# Patient Record
Sex: FEMALE | Race: WHITE | URBAN - METROPOLITAN AREA
[De-identification: names, ages, dates, MRNs, and addresses within clinical notes are randomized per-mention and may not be internally consistent; named-entity substitution may affect disease eponyms.]

---

## 2023-03-27 ENCOUNTER — Encounter (OUTPATIENT)
Facility: LOCATION | Age: 73
End: 2023-03-27
Payer: COMMERCIAL

## 2023-03-27 PROCEDURE — 66991 XCAPSL CTRC RMVL INSJ 1+: CPT | Performed by: OPHTHALMOLOGY

## 2023-03-28 ENCOUNTER — POST-OPERATIVE VISIT (OUTPATIENT)
Facility: LOCATION | Age: 73
End: 2023-03-28
Payer: COMMERCIAL

## 2023-03-28 DIAGNOSIS — Z48.810 ENCOUNTER FOR SURGICAL AFTERCARE FOLLOWING SURGERY ON A SENSE ORGAN: Primary | ICD-10-CM

## 2023-03-28 DIAGNOSIS — H40.063 PRIMARY ANGLE CLOSURE WITHOUT GLAUCOMA DAMAGE, BILATERAL: ICD-10-CM

## 2023-03-28 DIAGNOSIS — H40.053 OCULAR HYPERTENSION, BILATERAL: ICD-10-CM

## 2023-03-28 DIAGNOSIS — H25.12 AGE-RELATED NUCLEAR CATARACT, LEFT EYE: ICD-10-CM

## 2023-03-28 PROCEDURE — 99024 POSTOP FOLLOW-UP VISIT: CPT | Performed by: OPHTHALMOLOGY

## 2023-03-28 NOTE — IMPRESSION/PLAN
Impression: HPI 09/27/2022: Patient reports diagnosed with glaucoma 2.5 years ago in [place] by Optometrist for over 30 years and was started on Lumigan OU 2 years ago. Patient reports initially Rx Latanoprost but caused severe burning. Denies any other glaucoma gtt, or any eye procedures/lasers. POHx: PAC OS>OD, OHTN OU, Cataracts OU, MDADY, Hyperopia with astigmatism OU, s/p LPI OS 11/2022, (-) ocular/head trauma FOHx: (-) glaucoma PMHx: Arthritis, h/o skin cancer, headache, thyroid problems. H/o Latanoprost causing severe burning. Plan: Testing:
OCT/ONH 09/2022: OU normal NFL/GCC, C/D OS>OD. Baseline. HVF 24-2 11/2022: OU scattered changes. Baseline. Pachy: 493/488. Gonio 09/2022: S/N PTM, T SOLOMON, Inf Closed, no PAS - opens to SS on dynamic gonio OU. Gonio 11/08/2022: OD SOLOMON 360. OS S/I/N SOLOMON, Inf Closed. Gonio 11/30/2022: OD S/I PTM, T/N SOLOMON. OS N/I PTM, S SOLOMON, Closed. Gonio 2/2023: OU SOLOMON, no PAS OU - post LPI. Tmax: Not established. Target IOP: Not established. Plan:
Discontinue Lumigan QHD OD. Continue Lumigan QHS OS.

## 2023-03-28 NOTE — IMPRESSION/PLAN
Impression: Examination revealed moderate senile nuclear cataract. 2+ NS OS Plan: Informed patient of slow progression of cataracts. Informed patient blurry VA is due to MADDY, Cataracts, and old Rx glasses. Recommend patient to plan for cataract surgery to improve VA and resolve PACS. Discussed R/B/A/Is of MIGS at time of cataract surgery to improve glaucoma care. Discussed findings on repeat [place] on today's examination. Informed patient of high astigmatism. Discussed with patient of astigmatism correction at time of cataract surgery including Toric IOL and LRI. Informed patient of out-of-pocket cost for astigmatism correction. Risks, benefits and alternative of cataract extraction with intraocular lens reviewed with patient, including but not limited to: Decreased Vision, Infection, Bleeding, Loss of Vision, Loss of Eye, Retinal detachment, macular edema, the need for additional surgeries or laser treatments, double vision, and need for glasses and/or contact lenses. Plan: CEIOL/iStent OS Target distance.

## 2023-03-28 NOTE — IMPRESSION/PLAN
Impression: CC: 1 day s/p CEIOL/iStent OD (3/27/2023). Plan: Continue ATs QID OU. Glaucoma gtt:
Discontinue Lumigan QHD OD. Continue Lumigan QHS OS. Surgery medications:
Continue Ofloxacin QID Continue Prednisolone QID Continue Ketorolac QID
 
RTC 1 week - check VA, Ks, MR, IOP and re-evaluation of operated eye. Patient reminded of post-operative restrictions (do not rub eye, do not bend over, do not  more than 10-15 lbs, do not sleep on side of surgery, do not get water, dust or dirt in eye, wear plastic shield while sleeping). RTC PRN decrease VA, increase pain, redness, discharge, photophobia, flashes/floaters, or other vision problems.

## 2023-04-04 ENCOUNTER — POST-OPERATIVE VISIT (OUTPATIENT)
Facility: LOCATION | Age: 73
End: 2023-04-04
Payer: COMMERCIAL

## 2023-04-04 DIAGNOSIS — H25.12 AGE-RELATED NUCLEAR CATARACT, LEFT EYE: ICD-10-CM

## 2023-04-04 DIAGNOSIS — H40.053 OCULAR HYPERTENSION, BILATERAL: ICD-10-CM

## 2023-04-04 DIAGNOSIS — Z48.810 ENCOUNTER FOR SURGICAL AFTERCARE FOLLOWING SURGERY ON A SENSE ORGAN: Primary | ICD-10-CM

## 2023-04-04 DIAGNOSIS — H40.063 PRIMARY ANGLE CLOSURE WITHOUT GLAUCOMA DAMAGE, BILATERAL: ICD-10-CM

## 2023-04-04 PROCEDURE — 99024 POSTOP FOLLOW-UP VISIT: CPT | Performed by: OPHTHALMOLOGY

## 2023-04-04 PROCEDURE — 92015 DETERMINE REFRACTIVE STATE: CPT | Performed by: OPHTHALMOLOGY

## 2023-04-04 ASSESSMENT — VISUAL ACUITY: OD: 20/20

## 2023-04-04 ASSESSMENT — INTRAOCULAR PRESSURE
OD: 16
OS: 14

## 2023-04-04 ASSESSMENT — KERATOMETRY: OD: 46.99

## 2023-04-04 NOTE — IMPRESSION/PLAN
Impression: Examination revealed moderate senile nuclear cataract. 2+ NS OS Plan: Informed patient of slow progression of cataracts. Informed patient blurry VA is due to MADDY, Cataracts, and old Rx glasses. Recommend patient to plan for cataract surgery to improve VA and resolve PACS. Discussed R/B/A/Is of MIGS at time of cataract surgery to improve glaucoma care. Discussed findings on repeat [place] on today's examination. Informed patient of high astigmatism. Discussed with patient of astigmatism correction at time of cataract surgery including Toric IOL and LRI. Informed patient of out-of-pocket cost for astigmatism correction. Risks, benefits and alternative of cataract extraction with intraocular lens reviewed with patient, including but not limited to: Decreased Vision, Infection, Bleeding, Loss of Vision, Loss of Eye, Retinal detachment, macular edema, the need for additional surgeries or laser treatments, double vision, and need for glasses and/or contact lenses. Plan: CEIOL/iStent OS Target distance. Patient understands she will need reading and Rx glasses for astigmatism after cataract surgery.

## 2023-04-04 NOTE — IMPRESSION/PLAN
Impression: HPI 09/27/2022: Patient reports diagnosed with glaucoma 2.5 years ago in [place] by Optometrist for over 30 years and was started on Lumigan OU 2 years ago. Patient reports initially Rx Latanoprost but caused severe burning. Denies any other glaucoma gtt, or any eye procedures/lasers. POHx: PAC OS>OD, OHTN OU, Cataracts OU, MADDY, Hyperopia with astigmatism OU, s/p LPI OS 11/2022, (-) ocular/head trauma FOHx: (-) glaucoma PMHx: Arthritis, h/o skin cancer, headache, thyroid problems. H/o Latanoprost causing severe burning. Plan: Testing:
OCT/ONH 09/2022: OU normal NFL/GCC, C/D OS>OD. Baseline. HVF 24-2 11/2022: OU scattered changes. Baseline. Pachy: 493/488. Gonio 09/2022: S/N PTM, T SOLOMON, Inf Closed, no PAS - opens to SS on dynamic gonio OU. Gonio 11/08/2022: OD SOLOMON 360. OS S/I/N SOLOMON, Inf Closed. Gonio 11/30/2022: OD S/I PTM, T/N SOLOMON. OS N/I PTM, S SOLOMON, Closed. Gonio 2/2023: OU SOLOMON, no PAS OU - post LPI. Tmax: Not established. Target IOP: Not established. Plan:
Discontinue Lumigan QHD OD. Continue Lumigan QHS OS.

## 2023-04-04 NOTE — IMPRESSION/PLAN
Impression: 1 week s/p CEIOL/iStent OD (3/27/2023). s/p CEIOL surgery doing well. PCIOL centered and clear. (-) Siedel sign Patient is pseudophakic Plan: Continue ATs QID OU. Glaucoma gtt:
Discontinue Lumigan QHD OD. Continue Lumigan QHS OS. OD Sx gtts:
D/c Ofloxacin QID Decrease Prednisolone QID -> BID until 4/25/2023. Continue Ketorolac QID until 4/25/2023. RTC as scheduled for CEIOL/iStent OS (4/10/2023). Patient understands she will need reading and Rx glasses for astigmatism after cataract surgery. Post-op medications: Ofloxacin QID OS, Prednisolone QID OS, and Ketorolac QID OS. All restrictions discontinued. Patient able to resume all normal activities. RTC PRN decrease VA, increase pain, redness, discharge, photophobia, flashes/floaters, or other vision problems.

## 2023-04-10 ENCOUNTER — PROCEDURE (OUTPATIENT)
Facility: LOCATION | Age: 73
End: 2023-04-10
Payer: COMMERCIAL

## 2023-04-11 ENCOUNTER — POST-OPERATIVE VISIT (OUTPATIENT)
Facility: LOCATION | Age: 73
End: 2023-04-11
Payer: COMMERCIAL

## 2023-04-11 DIAGNOSIS — Z48.810 ENCOUNTER FOR SURGICAL AFTERCARE FOLLOWING SURGERY ON A SENSE ORGAN: Primary | ICD-10-CM

## 2023-04-11 PROCEDURE — 99024 POSTOP FOLLOW-UP VISIT: CPT | Performed by: OPHTHALMOLOGY

## 2023-04-11 ASSESSMENT — INTRAOCULAR PRESSURE
OD: 12
OS: 10

## 2023-04-11 NOTE — IMPRESSION/PLAN
Impression: 1 week s/p CEIOL/iStent/Anterior vitrectomy OD (04/10/2023); 2 week s/p CEIOL/iStent OD (3/27/2023). s/p CEIOL surgery doing well. PCIOL centered and clear. (-) Siedel sign Patient is pseudophakic BCL present OS. Plan: Continue ATs QID OU. Glaucoma gtt:
Hold off Lumigan OD. Continue Lumigan QHS OS. OS: Sx gtts:
Continue Ofloxacin QID OS. Continue Ketorolac QID OS Increase Prednisolone to Q6xday OS. OD Sx gtts:
D/c Ofloxacin QID Decrease Prednisolone QID -> BID until 4/25/2023. Continue Ketorolac QID until 4/25/2023. RTC as scheduled for CEIOL/iStent OS (4/10/2023). Patient understands she will need reading and Rx glasses for astigmatism after cataract surgery. RTC 1 week - BCL removal OS, check VA, Ks, MR, IOP and re-evaluation of operated eye. Plan to remove suture OS in 4-6 weeks. Patient reminded of post-operative restrictions (do not rub eye, do not bend over, do not  more than 10-15 lbs, do not sleep on side of surgery, do not get water, dust or dirt in eye, wear plastic shield while sleeping). RTC PRN decrease VA, increase pain, redness, discharge, photophobia, flashes/floaters, or other vision problems. Discontinue Ofloxacin Decrease Pred from QID to BID x 3 weeks then stop Continue Ketorolac QID x 3 weeks then stop

## 2023-04-18 ENCOUNTER — POST-OPERATIVE VISIT (OUTPATIENT)
Facility: LOCATION | Age: 73
End: 2023-04-18
Payer: COMMERCIAL

## 2023-04-18 DIAGNOSIS — Z48.810 ENCOUNTER FOR SURGICAL AFTERCARE FOLLOWING SURGERY ON A SENSE ORGAN: Primary | ICD-10-CM

## 2023-04-18 ASSESSMENT — INTRAOCULAR PRESSURE
OD: 12
OS: 17

## 2023-04-18 ASSESSMENT — VISUAL ACUITY: OS: 20/40

## 2023-04-18 NOTE — IMPRESSION/PLAN
Impression: 1 week s/p Complex CEIOL/iStent/CTR/Anterior Vitrectomy OS (04/10/2023); 3 week s/p CEIOL/iStent OD (3/27/2023). s/p CEIOL surgery doing well. PCIOL centered and clear. (-) Siedel sign Patient is pseudophakic ST suture in place BCL in place OS. BCL removed today. Plan: Continue ATs QID OU. Glaucoma gtt:
Hold off Lumigan OD. Continue Lumigan QHS OS. OS: Sx gtts:
Continue Ketorolac QID OS until 5/9/2023. Taper Prednisolone QID x 1 week, TID x 1 week, BID x 1 week, QD x 1 week, then d/c.

OD Sx gtts:
Continue Prednisolone BID until 4/25/2023. Continue Ketorolac QID until 4/25/2023. RTC in 3 week with DFE OU -  check VA, Ks, MR, IOP and re-evaluation of operated eye. Plan to remove suture OS in 4-6 weeks. RTC PRN decrease VA, increase pain, redness, discharge, photophobia, flashes/floaters, or other vision problems.

## 2023-05-10 ENCOUNTER — POST-OPERATIVE VISIT (OUTPATIENT)
Facility: LOCATION | Age: 73
End: 2023-05-10
Payer: COMMERCIAL

## 2023-05-10 DIAGNOSIS — H04.123 DRY EYE SYNDROME OF BILATERAL LACRIMAL GLANDS: ICD-10-CM

## 2023-05-10 DIAGNOSIS — H40.063 PRIMARY ANGLE CLOSURE WITHOUT GLAUCOMA DAMAGE, BILATERAL: ICD-10-CM

## 2023-05-10 DIAGNOSIS — Z48.810 ENCOUNTER FOR SURGICAL AFTERCARE FOLLOWING SURGERY ON A SENSE ORGAN: Primary | ICD-10-CM

## 2023-05-10 PROCEDURE — 92015 DETERMINE REFRACTIVE STATE: CPT | Performed by: OPHTHALMOLOGY

## 2023-05-10 PROCEDURE — 99024 POSTOP FOLLOW-UP VISIT: CPT | Performed by: OPHTHALMOLOGY

## 2023-05-10 ASSESSMENT — VISUAL ACUITY
OS: 20/20
OD: 20/20

## 2023-05-10 ASSESSMENT — INTRAOCULAR PRESSURE
OS: 15
OD: 12

## 2023-05-10 ASSESSMENT — KERATOMETRY
OS: 46.37
OD: 47.13

## 2023-05-10 NOTE — IMPRESSION/PLAN
Impression: HPI 09/27/2022: Patient reports diagnosed with glaucoma 2.5 years ago in [place] by Optometrist for over 30 years and was started on Lumigan OU 2 years ago. Patient reports initially Rx Latanoprost but caused severe burning. Denies any other glaucoma gtt, or any eye procedures/lasers. POHx: h/o PAC OS>OD, OHTN OU, s/p CEIOL/iStent OD 3/2023, s/p Complex/iStent/CTR/Ant Vitrectomy OS 4/2023, MADDY, Hyperopia with astigmatism OU, s/p LPI OS 11/2022, (-) ocular/head trauma FOHx: (-) glaucoma PMHx: Arthritis, h/o skin cancer, headache, thyroid problems. H/o Latanoprost causing severe burning. Plan: Testing:
OCT/ONH 09/2022: OU normal NFL/GCC, C/D OS>OD. Baseline. HVF 24-2 11/2022: OU scattered changes. Baseline. Pachy: 493/488. Gonio 09/2022: S/N PTM, T SOLOMON, Inf Closed, no PAS - opens to SS on dynamic gonio OU. Gonio 11/08/2022: OD SOLOMON 360. OS S/I/N SOLOMON, Inf Closed. Gonio 11/30/2022: OD S/I PTM, T/N SOLOMON. OS N/I PTM, S SOLOMON, Closed. Gonio 2/2023: OU SOLOMON, no PAS OU - post LPI. Tmax: Not established. Target IOP: Not established. Plan:
Monitor without glaucoma gtts. RTC in 2 months with OCT [address] 74 OU and gonio.

## 2023-05-10 NOTE — IMPRESSION/PLAN
Impression: 1 month s/p Complex CEIOL/iStent/CTR/Anterior Vitrectomy OS (04/10/2023); s/p CEIOL/iStent OD (3/27/2023). s/p CEIOL surgery doing well. PCIOL centered and clear. (-) Siedel sign Patient is pseudophakic T suture in place OS. Removed today with Jeweler and needle without any complications. AC Deep and quiet OU
2+ SPK OU Plan: Continue ATs QID OU. Glaucoma gtt:
Hold off Lumigan QHS OS. OS Sx gtts:
Continue Ketorolac QID OS until 5/19/2023. Taper Prednisolone QID x 1 week, TID x 1 week, BID x 1 week, QD x 1 week, then d/c.

RTC PRN decrease VA, increase pain, redness, discharge, photophobia, flashes/floaters, or other vision problems.

## 2023-07-12 ENCOUNTER — OFFICE VISIT (OUTPATIENT)
Facility: LOCATION | Age: 73
End: 2023-07-12
Payer: COMMERCIAL

## 2023-07-12 DIAGNOSIS — H40.063 PRIMARY ANGLE CLOSURE WITHOUT GLAUCOMA DAMAGE, BILATERAL: Primary | ICD-10-CM

## 2023-07-12 DIAGNOSIS — H04.123 DRY EYE SYNDROME OF BILATERAL LACRIMAL GLANDS: ICD-10-CM

## 2023-07-12 PROCEDURE — 99214 OFFICE O/P EST MOD 30 MIN: CPT | Performed by: OPHTHALMOLOGY

## 2023-07-12 PROCEDURE — 92020 GONIOSCOPY: CPT | Performed by: OPHTHALMOLOGY

## 2023-07-12 PROCEDURE — 92133 CPTRZD OPH DX IMG PST SGM ON: CPT | Performed by: OPHTHALMOLOGY

## 2023-07-12 ASSESSMENT — INTRAOCULAR PRESSURE
OS: 14
OD: 11

## 2023-07-12 NOTE — IMPRESSION/PLAN
Impression: Examination revealed dry eye syndrome secondary to tear deficiencies. BLL very small punctum. Unable to place RLL extended plug due to small punctum. s/p Extended duration BUL, LLL (2/21/2023) with good improvement. Plan: Today:
Significant dryness OS>OD despite regular use of ATs. Discussed R/B/A/Is of extended duration punctal plugs vs. Silicone punctal plugs as next step to further improve dryness. Extended Duration Punctal Plugs inserted BUL and Silicone punctal plugs inserted LLL. Unable to place RLL. Plan:
Continue ATs TID-QID OU. RTC in 4 weeks for surface check.

## 2023-07-12 NOTE — IMPRESSION/PLAN
Impression: 2 month follow up with OCT/ONH and Gonio. HPI 09/27/2022: Patient reports diagnosed with glaucoma 2.5 years ago in [place] by Optometrist for over 30 years and was started on Lumigan OU 2 years ago. Patient reports initially Rx Latanoprost but caused severe burning. Denies any other glaucoma gtt, or any eye procedures/lasers. POHx: OHTN OU, h/o PAC OS>OD, s/p CEIOL/iStent OD 3/2023, s/p Complex/iStent/CTR/Ant Vitrectomy OS 4/2023, MADDY, H/o Hyperopia with astigmatism OU, now myopic, s/p LPI OS 11/2022, (-) ocular/head trauma FOHx: (-) glaucoma PMHx: Arthritis, h/o skin cancer, headache, thyroid problems. H/o Latanoprost causing severe burning. Plan: Testing:
OCT/ONH 07/2023: OU normal NFL/GCC, C/D OS>OD. Stable OU. HVF 24-2 11/2022: OU scattered changes. Baseline. Pachy: 493/488. Gonio 09/2022: S/N PTM, T SOLOMON, Inf Closed, no PAS - opens to SS on dynamic gonio OU. Gonio 11/08/2022: OD SOLOMON 360. OS S/I/N SOLOMON, Inf Closed. Gonio 11/30/2022: OD S/I PTM, T/N SOLOMON. OS N/I PTM, S SOLOMON, Closed. Gonio 2/2023: OU SOLOMON, no PAS OU - post LPI. Gonio 07/2023: SS 2+ 360 OU. Tmax: Not established. Target IOP: Not established. Today:
IOP acceptable OU. OCT/ONH and Gonioscopy performed and reviewed. Informed patient drainage system opened up after cataract surgery. PACs resolved now. Plan:
Monitor without glaucoma gtts. RTC in 6 months with IOP check and DFE OU. If stable exam will follow up once a year.

## 2023-08-09 ENCOUNTER — OFFICE VISIT (OUTPATIENT)
Facility: LOCATION | Age: 73
End: 2023-08-09
Payer: COMMERCIAL

## 2023-08-09 DIAGNOSIS — H04.123 DRY EYE SYNDROME OF BILATERAL LACRIMAL GLANDS: Primary | ICD-10-CM

## 2023-08-09 DIAGNOSIS — H40.063 PRIMARY ANGLE CLOSURE WITHOUT GLAUCOMA DAMAGE, BILATERAL: ICD-10-CM

## 2023-08-09 PROCEDURE — 99213 OFFICE O/P EST LOW 20 MIN: CPT | Performed by: OPHTHALMOLOGY

## 2023-08-09 ASSESSMENT — INTRAOCULAR PRESSURE
OS: 13
OD: 10

## 2024-01-10 ENCOUNTER — OFFICE VISIT (OUTPATIENT)
Facility: LOCATION | Age: 74
End: 2024-01-10
Payer: COMMERCIAL

## 2024-01-10 DIAGNOSIS — H04.123 DRY EYE SYNDROME OF BILATERAL LACRIMAL GLANDS: ICD-10-CM

## 2024-01-10 DIAGNOSIS — H40.063 PRIMARY ANGLE CLOSURE WITHOUT GLAUCOMA DAMAGE, BILATERAL: Primary | ICD-10-CM

## 2024-01-10 DIAGNOSIS — H26.492 OTHER SECONDARY CATARACT, LEFT EYE: ICD-10-CM

## 2024-01-10 PROCEDURE — 99214 OFFICE O/P EST MOD 30 MIN: CPT | Performed by: OPHTHALMOLOGY

## 2024-01-10 ASSESSMENT — INTRAOCULAR PRESSURE
OD: 12
OS: 16